# Patient Record
Sex: FEMALE | Race: WHITE | NOT HISPANIC OR LATINO | URBAN - NONMETROPOLITAN AREA
[De-identification: names, ages, dates, MRNs, and addresses within clinical notes are randomized per-mention and may not be internally consistent; named-entity substitution may affect disease eponyms.]

---

## 2018-10-25 PROBLEM — Z96.1: Noted: 2017-10-11

## 2018-10-25 PROBLEM — H52.4: Noted: 2018-10-25

## 2018-10-25 PROBLEM — H26.491: Noted: 2017-10-11

## 2018-10-25 PROBLEM — H25.12: Noted: 2017-10-11

## 2018-10-25 PROBLEM — H43.813: Noted: 2018-10-25

## 2018-10-25 PROBLEM — H35.342: Noted: 2017-10-11

## 2018-10-25 PROBLEM — H35.373: Noted: 2017-10-11

## 2019-04-18 ENCOUNTER — IMPORTED ENCOUNTER (OUTPATIENT)
Dept: URBAN - NONMETROPOLITAN AREA CLINIC 1 | Facility: CLINIC | Age: 84
End: 2019-04-18

## 2019-04-18 PROCEDURE — 92134 CPTRZ OPH DX IMG PST SGM RTA: CPT

## 2019-04-18 PROCEDURE — 92014 COMPRE OPH EXAM EST PT 1/>: CPT

## 2019-04-18 NOTE — PATIENT DISCUSSION
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 -  OCT done today ERM and macular hole OS are stable  -  Previously recommended referral to Dr. Garo Peacock after CE OD if patient desires intervetion. However pt still defers at this time. -  Will continue to monitor closely. P/C IOL OD w/ PCO:  (Standard)-  Discussed findings w/ pt today-  [facility]-  PCO noted but not visually significant. -  Continue to monitor yearly or PRN. Cataract OS: -  Discussed findings w/ pt today-  Sign/symptoms associated discussed with progression-  Discussed UV protection -  Will continue to monitor for nowPVD OU- Discussed findings of exam in detail with the patient. - The risk of retinal detachment in patients with PVDs was discussed with the patient and the warning signs of retinal detachment were carefully reviewed with the patient. - The patient was warned to return to the office or contact the ophthalmologist on call immediately if they experience signs of retinal detachment or changes in vision noted at this time. 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  -  Continue to monitor; 's Notes: MR  4/9/18DFE  4/18/19Optos  10/17/18OCT mac 4/18/19

## 2019-10-24 ENCOUNTER — IMPORTED ENCOUNTER (OUTPATIENT)
Dept: URBAN - NONMETROPOLITAN AREA CLINIC 1 | Facility: CLINIC | Age: 84
End: 2019-10-24

## 2019-10-24 PROCEDURE — 92014 COMPRE OPH EXAM EST PT 1/>: CPT

## 2019-10-24 PROCEDURE — 92250 FUNDUS PHOTOGRAPHY W/I&R: CPT

## 2019-10-24 NOTE — PATIENT DISCUSSION
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 -  OCT done previously ERM and macular hole OS are stable- Optos done today shows stable ERM OU -  Previously recommended referral to Dr. Mel Cerrato after CE OD if patient desires intervetion. However pt still defers at this time. -  Will continue to monitor closely. P/C IOL OD w/ PCO:  (Standard)-  Discussed findings w/ pt today-  [facility]-  PCO noted but not visually significant. -  Continue to monitor yearly or PRN. Cataract OS: -  Discussed findings w/ pt today-  Sign/symptoms associated discussed with progression-  Discussed UV protection -  Will continue to monitor for nowPVD OU- Discussed findings of exam in detail with the patient. - The risk of retinal detachment in patients with PVDs was discussed with the patient and the warning signs of retinal detachment were carefully reviewed with the patient. - The patient was warned to return to the office or contact the ophthalmologist on call immediately if they experience signs of retinal detachment or changes in vision noted at this time. 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  -  Continue to monitor; 's Notes: MR  4/9/18DFE  4/18/19Optos  10/24/2019OCT mac 4/18/19

## 2022-04-10 ASSESSMENT — TONOMETRY
OS_IOP_MMHG: 17
OD_IOP_MMHG: 16
OD_IOP_MMHG: 16
OS_IOP_MMHG: 16

## 2022-04-10 ASSESSMENT — VISUAL ACUITY
OD_PH: 20/30
OS_SC: CF4'
OD_SC: 20/40
OD_SC: 20/30
OS_SC: CF4'